# Patient Record
Sex: MALE | URBAN - METROPOLITAN AREA
[De-identification: names, ages, dates, MRNs, and addresses within clinical notes are randomized per-mention and may not be internally consistent; named-entity substitution may affect disease eponyms.]

---

## 2022-10-03 ENCOUNTER — TELEPHONE ENCOUNTER (OUTPATIENT)
Dept: URBAN - METROPOLITAN AREA CLINIC 84 | Facility: CLINIC | Age: 27
End: 2022-10-03

## 2022-10-03 ENCOUNTER — WEB ENCOUNTER (OUTPATIENT)
Dept: URBAN - METROPOLITAN AREA CLINIC 84 | Facility: CLINIC | Age: 27
End: 2022-10-03

## 2022-10-03 ENCOUNTER — OFFICE VISIT (OUTPATIENT)
Dept: URBAN - METROPOLITAN AREA CLINIC 84 | Facility: CLINIC | Age: 27
End: 2022-10-03
Payer: COMMERCIAL

## 2022-10-03 VITALS
HEART RATE: 64 BPM | TEMPERATURE: 98.1 F | BODY MASS INDEX: 25.04 KG/M2 | WEIGHT: 155.8 LBS | SYSTOLIC BLOOD PRESSURE: 126 MMHG | HEIGHT: 66 IN | DIASTOLIC BLOOD PRESSURE: 79 MMHG

## 2022-10-03 DIAGNOSIS — R19.8 ANAL DISCHARGE: ICD-10-CM

## 2022-10-03 DIAGNOSIS — K62.89 RECTAL PAIN: ICD-10-CM

## 2022-10-03 PROCEDURE — 99204 OFFICE O/P NEW MOD 45 MIN: CPT | Performed by: INTERNAL MEDICINE

## 2022-10-03 RX ORDER — HYOSCYAMINE SULFATE 0.12 MG/1
TAKE 1 TABLET TABLET ORAL THREE TIMES A DAY
Qty: 90 TABLET | Refills: 2 | OUTPATIENT
Start: 2022-10-03 | End: 2022-12-31

## 2022-10-03 NOTE — HPI-TODAY'S VISIT:
28 yo pt presents with c/o intermittent generalized abd pain and intermittent cloudy white rectal discharge with anal pruritis and rectal pain for 1 yr. Last episode of rectal discharge was 2 weeks. Pt denies hx of hemorrhoidal disorder/anal fissure/anal trauma.

## 2022-10-07 ENCOUNTER — CLAIMS CREATED FROM THE CLAIM WINDOW (OUTPATIENT)
Dept: URBAN - METROPOLITAN AREA CLINIC 4 | Facility: CLINIC | Age: 27
End: 2022-10-07
Payer: COMMERCIAL

## 2022-10-07 ENCOUNTER — OFFICE VISIT (OUTPATIENT)
Dept: URBAN - METROPOLITAN AREA SURGERY CENTER 20 | Facility: SURGERY CENTER | Age: 27
End: 2022-10-07
Payer: COMMERCIAL

## 2022-10-07 DIAGNOSIS — K63.89 BACTERIAL OVERGROWTH SYNDROME: ICD-10-CM

## 2022-10-07 DIAGNOSIS — R10.84 ABDOMINAL CRAMPING, GENERALIZED: ICD-10-CM

## 2022-10-07 DIAGNOSIS — K63.89 OTHER SPECIFIED DISEASES OF INTESTINE: ICD-10-CM

## 2022-10-07 PROCEDURE — 88305 TISSUE EXAM BY PATHOLOGIST: CPT | Performed by: PATHOLOGY

## 2022-10-07 PROCEDURE — 45331 SIGMOIDOSCOPY AND BIOPSY: CPT | Performed by: INTERNAL MEDICINE

## 2022-10-07 PROCEDURE — G8907 PT DOC NO EVENTS ON DISCHARG: HCPCS | Performed by: INTERNAL MEDICINE

## 2022-11-07 ENCOUNTER — OFFICE VISIT (OUTPATIENT)
Dept: URBAN - METROPOLITAN AREA CLINIC 84 | Facility: CLINIC | Age: 27
End: 2022-11-07
Payer: COMMERCIAL

## 2022-11-07 VITALS
HEIGHT: 66 IN | HEART RATE: 94 BPM | WEIGHT: 155.4 LBS | TEMPERATURE: 97.6 F | DIASTOLIC BLOOD PRESSURE: 73 MMHG | BODY MASS INDEX: 24.98 KG/M2 | SYSTOLIC BLOOD PRESSURE: 114 MMHG

## 2022-11-07 DIAGNOSIS — R19.8 ANAL DISCHARGE: ICD-10-CM

## 2022-11-07 DIAGNOSIS — R10.9 ABDOMINAL DISCOMFORT: ICD-10-CM

## 2022-11-07 DIAGNOSIS — K62.89 RECTAL PAIN: ICD-10-CM

## 2022-11-07 PROBLEM — 77880009: Status: ACTIVE | Noted: 2022-10-03

## 2022-11-07 PROCEDURE — 99214 OFFICE O/P EST MOD 30 MIN: CPT | Performed by: INTERNAL MEDICINE

## 2022-11-07 RX ORDER — HYDROCORTISONE 25 MG/G
1 APPLICATION CREAM TOPICAL TWICE A DAY
Qty: 15 GRAM | Refills: 1 | OUTPATIENT
Start: 2022-11-07 | End: 2023-01-06

## 2022-11-07 RX ORDER — HYOSCYAMINE SULFATE 0.12 MG/1
TAKE 1 TABLET TABLET ORAL THREE TIMES A DAY
Qty: 90 TABLET | Refills: 2 | Status: ACTIVE | COMMUNITY
Start: 2022-10-03 | End: 2022-12-31

## 2022-11-07 NOTE — HPI-TODAY'S VISIT:
26 yo pt present =s for f/u of food intolerance resulting in frequent BM with rectal itching/burnig from wiping to excuriation.

## 2022-11-07 NOTE — PHYSICAL EXAM HENT:
Head , normocephalic , atraumatic , Face , Face within normal limits , Ears , External ears within normal limits , Nose/Nasopharynx , External nose  normal appearance , Mouth and Throat , Oral cavity appearance normal Julia to follow up with Primary Care provider regarding elevated blood pressure.

## 2023-02-13 ENCOUNTER — DASHBOARD ENCOUNTERS (OUTPATIENT)
Age: 28
End: 2023-02-13

## 2023-02-13 ENCOUNTER — OFFICE VISIT (OUTPATIENT)
Dept: URBAN - METROPOLITAN AREA CLINIC 84 | Facility: CLINIC | Age: 28
End: 2023-02-13
Payer: COMMERCIAL

## 2023-02-13 VITALS
DIASTOLIC BLOOD PRESSURE: 84 MMHG | HEART RATE: 60 BPM | BODY MASS INDEX: 26.16 KG/M2 | SYSTOLIC BLOOD PRESSURE: 121 MMHG | HEIGHT: 66 IN | TEMPERATURE: 97.6 F | WEIGHT: 162.8 LBS

## 2023-02-13 DIAGNOSIS — R14.0 BLOATING: ICD-10-CM

## 2023-02-13 DIAGNOSIS — R19.8 ANAL DISCHARGE: ICD-10-CM

## 2023-02-13 PROBLEM — 249661007: Status: ACTIVE | Noted: 2022-10-03

## 2023-02-13 PROCEDURE — 99214 OFFICE O/P EST MOD 30 MIN: CPT | Performed by: INTERNAL MEDICINE

## 2023-02-13 RX ORDER — HYDROCORTISONE 25 MG/G
1 APPLICATION CREAM TOPICAL TWICE A DAY
Qty: 15 GRAM | Refills: 4